# Patient Record
Sex: FEMALE | Race: WHITE | NOT HISPANIC OR LATINO | Employment: OTHER | URBAN - METROPOLITAN AREA
[De-identification: names, ages, dates, MRNs, and addresses within clinical notes are randomized per-mention and may not be internally consistent; named-entity substitution may affect disease eponyms.]

---

## 2017-08-18 ENCOUNTER — INFUSION (OUTPATIENT)
Dept: ONCOLOGY | Facility: HOSPITAL | Age: 67
End: 2017-08-18

## 2017-08-18 ENCOUNTER — HOSPITAL ENCOUNTER (EMERGENCY)
Facility: HOSPITAL | Age: 67
Discharge: HOME | End: 2017-08-18
Attending: EMERGENCY MEDICINE | Admitting: EMERGENCY MEDICINE

## 2017-08-18 ENCOUNTER — APPOINTMENT (OUTPATIENT)
Dept: GENERAL RADIOLOGY | Facility: HOSPITAL | Age: 67
End: 2017-08-18

## 2017-08-18 VITALS
OXYGEN SATURATION: 99 % | RESPIRATION RATE: 16 BRPM | HEART RATE: 63 BPM | TEMPERATURE: 98.7 F | WEIGHT: 179 LBS | HEIGHT: 63 IN | SYSTOLIC BLOOD PRESSURE: 180 MMHG | DIASTOLIC BLOOD PRESSURE: 53 MMHG | BODY MASS INDEX: 31.71 KG/M2

## 2017-08-18 VITALS
WEIGHT: 180 LBS | BODY MASS INDEX: 31.89 KG/M2 | HEART RATE: 71 BPM | TEMPERATURE: 97.8 F | DIASTOLIC BLOOD PRESSURE: 48 MMHG | SYSTOLIC BLOOD PRESSURE: 192 MMHG | HEIGHT: 63 IN | RESPIRATION RATE: 16 BRPM

## 2017-08-18 DIAGNOSIS — D64.9 ANEMIA, UNSPECIFIED TYPE: ICD-10-CM

## 2017-08-18 DIAGNOSIS — N18.9 CHRONIC RENAL FAILURE, UNSPECIFIED STAGE: ICD-10-CM

## 2017-08-18 DIAGNOSIS — R73.9 HYPERGLYCEMIA: ICD-10-CM

## 2017-08-18 DIAGNOSIS — R60.9 EDEMA, UNSPECIFIED TYPE: ICD-10-CM

## 2017-08-18 DIAGNOSIS — N18.30 CHRONIC KIDNEY DISEASE, STAGE III (MODERATE) (HCC): Primary | ICD-10-CM

## 2017-08-18 DIAGNOSIS — I51.7 CARDIOMEGALY: ICD-10-CM

## 2017-08-18 DIAGNOSIS — R00.2 PALPITATIONS: Primary | ICD-10-CM

## 2017-08-18 LAB
ALBUMIN SERPL-MCNC: 3.9 G/DL (ref 3.2–4.8)
ALBUMIN/GLOB SERPL: 1.4 G/DL (ref 1.5–2.5)
ALP SERPL-CCNC: 113 U/L (ref 25–100)
ALT SERPL W P-5'-P-CCNC: 18 U/L (ref 7–40)
ANION GAP SERPL CALCULATED.3IONS-SCNC: 10 MMOL/L (ref 3–11)
AST SERPL-CCNC: 18 U/L (ref 0–33)
BASOPHILS # BLD AUTO: 0.06 10*3/MM3 (ref 0–0.2)
BASOPHILS NFR BLD AUTO: 0.7 % (ref 0–1)
BILIRUB SERPL-MCNC: 0.3 MG/DL (ref 0.3–1.2)
BNP SERPL-MCNC: 254 PG/ML (ref 0–100)
BUN BLD-MCNC: 70 MG/DL (ref 9–23)
BUN/CREAT SERPL: 30.4 (ref 7–25)
CALCIUM SPEC-SCNC: 9.1 MG/DL (ref 8.7–10.4)
CHLORIDE SERPL-SCNC: 108 MMOL/L (ref 99–109)
CO2 SERPL-SCNC: 24 MMOL/L (ref 20–31)
CREAT BLD-MCNC: 2.3 MG/DL (ref 0.6–1.3)
DEPRECATED RDW RBC AUTO: 44.3 FL (ref 37–54)
EOSINOPHIL # BLD AUTO: 0.29 10*3/MM3 (ref 0–0.3)
EOSINOPHIL NFR BLD AUTO: 3.3 % (ref 0–3)
ERYTHROCYTE [DISTWIDTH] IN BLOOD BY AUTOMATED COUNT: 14.1 % (ref 11.3–14.5)
ERYTHROCYTE [DISTWIDTH] IN BLOOD BY AUTOMATED COUNT: 14.5 % (ref 11.3–14.5)
GFR SERPL CREATININE-BSD FRML MDRD: 21 ML/MIN/1.73
GLOBULIN UR ELPH-MCNC: 2.7 GM/DL
GLUCOSE BLD-MCNC: 142 MG/DL (ref 70–100)
HCT VFR BLD AUTO: 22.2 % (ref 34.5–44)
HCT VFR BLD AUTO: 24.2 % (ref 34.5–44)
HGB BLD-MCNC: 7.1 G/DL (ref 11.5–15.5)
HGB BLD-MCNC: 7.9 G/DL (ref 11.5–15.5)
HOLD SPECIMEN: NORMAL
HOLD SPECIMEN: NORMAL
IMM GRANULOCYTES # BLD: 0.02 10*3/MM3 (ref 0–0.03)
IMM GRANULOCYTES NFR BLD: 0.2 % (ref 0–0.6)
LYMPHOCYTES # BLD AUTO: 1.06 10*3/MM3 (ref 0.6–4.8)
LYMPHOCYTES # BLD AUTO: 1.2 10*3/MM3 (ref 0.6–4.8)
LYMPHOCYTES NFR BLD AUTO: 12.2 % (ref 24–44)
LYMPHOCYTES NFR BLD AUTO: 14.2 % (ref 24–44)
MAGNESIUM SERPL-MCNC: 2.6 MG/DL (ref 1.3–2.7)
MCH RBC QN AUTO: 26.5 PG (ref 27–31)
MCH RBC QN AUTO: 27.5 PG (ref 27–31)
MCHC RBC AUTO-ENTMCNC: 32.1 G/DL (ref 32–36)
MCHC RBC AUTO-ENTMCNC: 32.6 G/DL (ref 32–36)
MCV RBC AUTO: 82.4 FL (ref 80–99)
MCV RBC AUTO: 84.3 FL (ref 80–99)
MONOCYTES # BLD AUTO: 0.4 10*3/MM3 (ref 0–1)
MONOCYTES # BLD AUTO: 0.64 10*3/MM3 (ref 0–1)
MONOCYTES NFR BLD AUTO: 4.4 % (ref 0–12)
MONOCYTES NFR BLD AUTO: 7.3 % (ref 0–12)
NEUTROPHILS # BLD AUTO: 6.6 10*3/MM3 (ref 1.5–8.3)
NEUTROPHILS # BLD AUTO: 6.65 10*3/MM3 (ref 1.5–8.3)
NEUTROPHILS NFR BLD AUTO: 76.3 % (ref 41–71)
NEUTROPHILS NFR BLD AUTO: 81.4 % (ref 41–71)
PLATELET # BLD AUTO: 227 10*3/MM3 (ref 150–450)
PLATELET # BLD AUTO: 257 10*3/MM3 (ref 150–450)
PMV BLD AUTO: 10.2 FL (ref 6–12)
PMV BLD AUTO: 8.6 FL (ref 6–12)
POTASSIUM BLD-SCNC: 4.5 MMOL/L (ref 3.5–5.5)
PROT SERPL-MCNC: 6.6 G/DL (ref 5.7–8.2)
RBC # BLD AUTO: 2.69 10*6/MM3 (ref 3.89–5.14)
RBC # BLD AUTO: 2.87 10*6/MM3 (ref 3.89–5.14)
SODIUM BLD-SCNC: 142 MMOL/L (ref 132–146)
TROPONIN I SERPL-MCNC: 0 NG/ML (ref 0–0.07)
TROPONIN I SERPL-MCNC: 0 NG/ML (ref 0–0.07)
WBC NRBC COR # BLD: 8.1 10*3/MM3 (ref 3.5–10.8)
WBC NRBC COR # BLD: 8.72 10*3/MM3 (ref 3.5–10.8)
WHOLE BLOOD HOLD SPECIMEN: NORMAL
WHOLE BLOOD HOLD SPECIMEN: NORMAL

## 2017-08-18 PROCEDURE — 93010 ELECTROCARDIOGRAM REPORT: CPT | Performed by: INTERNAL MEDICINE

## 2017-08-18 PROCEDURE — 93005 ELECTROCARDIOGRAM TRACING: CPT | Performed by: EMERGENCY MEDICINE

## 2017-08-18 PROCEDURE — 99284 EMERGENCY DEPT VISIT MOD MDM: CPT

## 2017-08-18 PROCEDURE — 84484 ASSAY OF TROPONIN QUANT: CPT

## 2017-08-18 PROCEDURE — 83880 ASSAY OF NATRIURETIC PEPTIDE: CPT | Performed by: EMERGENCY MEDICINE

## 2017-08-18 PROCEDURE — 80053 COMPREHEN METABOLIC PANEL: CPT | Performed by: EMERGENCY MEDICINE

## 2017-08-18 PROCEDURE — 83735 ASSAY OF MAGNESIUM: CPT | Performed by: EMERGENCY MEDICINE

## 2017-08-18 PROCEDURE — 71010 HC CHEST PA OR AP: CPT

## 2017-08-18 PROCEDURE — 85025 COMPLETE CBC W/AUTO DIFF WBC: CPT | Performed by: EMERGENCY MEDICINE

## 2017-08-18 RX ORDER — SODIUM CHLORIDE 9 MG/ML
250 INJECTION, SOLUTION INTRAVENOUS ONCE
Status: DISCONTINUED | OUTPATIENT
Start: 2017-08-18 | End: 2017-08-18 | Stop reason: HOSPADM

## 2017-08-18 RX ORDER — ATORVASTATIN CALCIUM 20 MG/1
20 TABLET, FILM COATED ORAL NIGHTLY
COMMUNITY

## 2017-08-18 RX ORDER — ASPIRIN 81 MG/1
81 TABLET ORAL DAILY
COMMUNITY

## 2017-08-18 RX ORDER — TORSEMIDE 20 MG/1
20 TABLET ORAL 2 TIMES DAILY
COMMUNITY

## 2017-08-18 RX ORDER — LEVOTHYROXINE SODIUM 112 UG/1
112 TABLET ORAL DAILY
COMMUNITY

## 2017-08-18 RX ORDER — CARVEDILOL 25 MG/1
12.5 TABLET ORAL NIGHTLY
COMMUNITY

## 2017-08-18 RX ORDER — CLONIDINE HYDROCHLORIDE 0.1 MG/1
0.1 TABLET ORAL ONCE
Status: DISCONTINUED | OUTPATIENT
Start: 2017-08-18 | End: 2017-08-18 | Stop reason: HOSPADM

## 2017-08-18 RX ORDER — INSULIN GLARGINE 100 [IU]/ML
15 INJECTION, SOLUTION SUBCUTANEOUS DAILY
COMMUNITY

## 2017-08-18 RX ORDER — CLONIDINE HYDROCHLORIDE 0.1 MG/1
0.1 TABLET ORAL ONCE
Status: DISCONTINUED | OUTPATIENT
Start: 2017-08-18 | End: 2017-08-18 | Stop reason: SINTOL

## 2017-08-18 RX ORDER — CALCITRIOL 0.25 UG/1
0.25 CAPSULE, LIQUID FILLED ORAL 2 TIMES WEEKLY
Status: ON HOLD | COMMUNITY
End: 2022-03-23

## 2017-08-18 RX ORDER — SODIUM CHLORIDE 9 MG/ML
250 INJECTION, SOLUTION INTRAVENOUS ONCE
Status: CANCELLED | OUTPATIENT
Start: 2017-08-18

## 2017-08-18 RX ORDER — VALSARTAN 160 MG/1
160 TABLET ORAL 2 TIMES DAILY
COMMUNITY

## 2017-08-18 RX ORDER — RANITIDINE HCL 75 MG
75 TABLET ORAL 2 TIMES DAILY
Status: ON HOLD | COMMUNITY
End: 2022-03-23

## 2017-08-18 RX ORDER — FERROUS SULFATE 325(65) MG
325 TABLET ORAL
Status: ON HOLD | COMMUNITY
End: 2022-03-23

## 2017-08-18 RX ORDER — SODIUM CHLORIDE 0.9 % (FLUSH) 0.9 %
10 SYRINGE (ML) INJECTION AS NEEDED
Status: DISCONTINUED | OUTPATIENT
Start: 2017-08-18 | End: 2017-08-18 | Stop reason: HOSPADM

## 2017-08-18 RX ORDER — CLONIDINE HYDROCHLORIDE 0.1 MG/1
0.1 TABLET ORAL EVERY 12 HOURS SCHEDULED
Status: DISCONTINUED | OUTPATIENT
Start: 2017-08-18 | End: 2017-08-18 | Stop reason: SDUPTHER

## 2017-08-21 ENCOUNTER — HOSPITAL ENCOUNTER (OUTPATIENT)
Dept: CARDIOLOGY | Facility: HOSPITAL | Age: 67
End: 2017-08-21

## 2017-08-21 ENCOUNTER — APPOINTMENT (OUTPATIENT)
Dept: ONCOLOGY | Facility: HOSPITAL | Age: 67
End: 2017-08-21

## 2017-08-21 ENCOUNTER — OFFICE VISIT (OUTPATIENT)
Dept: CARDIOLOGY | Facility: HOSPITAL | Age: 67
End: 2017-08-21

## 2017-08-21 VITALS
WEIGHT: 178.6 LBS | DIASTOLIC BLOOD PRESSURE: 44 MMHG | BODY MASS INDEX: 31.64 KG/M2 | HEART RATE: 63 BPM | OXYGEN SATURATION: 100 % | RESPIRATION RATE: 16 BRPM | SYSTOLIC BLOOD PRESSURE: 141 MMHG | HEIGHT: 63 IN | TEMPERATURE: 97.8 F

## 2017-08-21 DIAGNOSIS — I50.32 CHRONIC CONGESTIVE HEART FAILURE WITH LEFT VENTRICULAR DIASTOLIC DYSFUNCTION (HCC): ICD-10-CM

## 2017-08-21 DIAGNOSIS — R07.9 CHEST PAIN IN ADULT: ICD-10-CM

## 2017-08-21 DIAGNOSIS — E10.59 TYPE 1 DIABETES MELLITUS WITH OTHER CIRCULATORY COMPLICATION (HCC): ICD-10-CM

## 2017-08-21 DIAGNOSIS — R00.2 PALPITATIONS: Primary | ICD-10-CM

## 2017-08-21 DIAGNOSIS — Q21.12 PFO (PATENT FORAMEN OVALE): ICD-10-CM

## 2017-08-21 DIAGNOSIS — N18.30 CHRONIC KIDNEY DISEASE, STAGE III (MODERATE) (HCC): ICD-10-CM

## 2017-08-21 PROCEDURE — 99214 OFFICE O/P EST MOD 30 MIN: CPT | Performed by: NURSE PRACTITIONER

## 2017-08-21 NOTE — PROGRESS NOTES
Subjective:     Encounter Date:08/21/2017      Patient ID: Loren Herrera is a 66 y.o. female.    Chief Complaint:  History of Present Illness:  Mrs. Herrera comes into the heart and valve clinic at the request of Dr. Roge Echavarria patient has a long history of type 1 diabetes, approximately 20 years duration.  [facility] for chronic kidney disease and related anemia.  Patient was scheduled for her routine iron infusion on 8/18/2017, however, she related worsening palpitations associated with chest tightness and shortness of breath for approximately one to 2 weeks duration.  Mrs. Herrera had seen her primary care physician, Dr. Munoz regarding his palpitations and a Holter monitor had been performed but the report has not yet been generated.  During the course of the emergency department evaluation, it was felt that she was suffering from some mild congestive heart failure which warranted cardiology follow-up.    Mrs. Herrera tells me that she had followed with Dr. Shepard at the [facility] up until about 18 months ago.  Over the past few years she has contemplated moving forward toward the kidney transplant list at , but presently she relates she is undecided about that decision due to her feeling of being overwhelmed I also of the diagnostic screening which must be performed. She brings in a clinic note from Dr. Shepard (2010) which appears to be clinic follow-up after echocardiogram and nuclear stress test with Lexiscan. No ischemic evidence noted.  Incidental PFO noted on echo.      During the emergency department evaluation, slightly elevated BNP and mild cardiomegaly/small right pleural effusion were noted.  Patient states she feels better today in regards to chest tightness after the diuretic was increased.  She states she has not been quite as symptomatic at the palpitations in the last 2 days also.    Past Medical History:   Diagnosis Date   • Anemia    •  Diabetes mellitus    • Disease of thyroid gland    • Hypertension        Past Surgical History:   Procedure Laterality Date   • ARTERIOVENOUS FISTULA     • BREAST SURGERY      lumpectomy   •  SECTION     • EYE SURGERY     • HYSTERECTOMY     • KNEE ARTHROPLASTY     • TONSILLECTOMY         Social History     Social History   • Marital status:      Spouse name: N/A   • Number of children: N/A   • Years of education: N/A     Occupational History   • Not on file.     Social History Main Topics   • Smoking status: Never Smoker   • Smokeless tobacco: Never Used   • Alcohol use No   • Drug use: No   • Sexual activity: Not on file     Other Topics Concern   • Not on file     Social History Narrative    Lives alone    Patient consumes 2 serving of caffeine daily. Stopped consuming caffeine last week.                Family History   Problem Relation Age of Onset   • Heart attack Father        Review of Systems   Constitution: Positive for weakness and malaise/fatigue. Negative for chills, decreased appetite, diaphoresis, fever, night sweats, weight gain and weight loss.   HENT: Negative for congestion, headaches, hearing loss, hoarse voice and nosebleeds.    Eyes: Negative for blurred vision, visual disturbance and visual halos.   Cardiovascular: Positive for dyspnea on exertion, irregular heartbeat, leg swelling and palpitations. Negative for chest pain, claudication, cyanosis, near-syncope, orthopnea, paroxysmal nocturnal dyspnea and syncope.   Respiratory: Positive for cough and shortness of breath. Negative for hemoptysis, sleep disturbances due to breathing, snoring, sputum production and wheezing.    Endocrine: Positive for cold intolerance.   Hematologic/Lymphatic: Negative for bleeding problem. Bruises/bleeds easily.   Skin: Negative for dry skin, itching and rash.   Musculoskeletal: Positive for joint pain and muscle cramps. Negative for arthritis, joint swelling and myalgias.   Gastrointestinal:  Positive for bloating, constipation, heartburn, nausea and vomiting. Negative for abdominal pain, diarrhea, flatus, hematemesis, hematochezia and melena.   Genitourinary: Negative for dysuria, frequency, hematuria, nocturia and urgency.   Neurological: Positive for excessive daytime sleepiness. Negative for dizziness, light-headedness and loss of balance.   Psychiatric/Behavioral: Negative for depression. The patient does not have insomnia and is not nervous/anxious.          Objective:     Physical Exam   Constitutional: She is oriented to person, place, and time. She appears well-developed and well-nourished. No distress.   HENT:   Head: Normocephalic.   Eyes: Pupils are equal, round, and reactive to light.   Neck: Neck supple. No JVD present. No tracheal deviation present.   Cardiovascular: Normal rate, regular rhythm and intact distal pulses.    No murmur heard.  Split S1, S2 heart tones.     Pulmonary/Chest: Effort normal and breath sounds normal. No respiratory distress. She has no wheezes. She has no rales. She exhibits no tenderness.   Abdominal: Soft. Bowel sounds are normal. There is no tenderness.   Musculoskeletal: Normal range of motion. She exhibits no edema.   Neurological: She is alert and oriented to person, place, and time.   Skin: Skin is warm and dry. No rash noted.   Psychiatric: She has a normal mood and affect. Her behavior is normal.       Lab Review: ER notes, labs, EKG's     Assessment/ Plan:           Diagnosis Plan   1. Palpitations with atypical chest pain  Her risk factors for coronary artery disease include long term type 1 diabetes requiring insulin use, obesity, age over 50, and poorly controlled hypertension.  Recommend myocardial perfusion study with Lexiscan.  We will obtain the 24-hour Holter report once available via primary care.  Will have patient follow-up with cardiology.       2. Chronic kidney disease, stage III (moderate) with chronic anemia  [facility]  Recent AV fistula placement to the left antecubital area approximate 7 weeks ago.  Patient is contemplating entering the kidney transplant clinic at .           4. Type 1 diabetes mellitus with other circulatory complication  Increase overall risk of cardiovascular events        5. PFO (patent foramen ovale)  Incidental finding per echocardiogram 2010.  No syncopal episodes or other neurological symptoms.       6. Chronic congestive heart failure with left ventricular diastolic dysfunction  Check follow-up echocardiogram to reassess diastolic dysfunction.  Symptomatically improvement with increased diuretic.  However, this requires close monitoring per nephrology.        Patient will be contacted with the results of her stress test and echocardiogram for appropriate timetable of cardiology clinic follow-up.

## 2017-09-15 ENCOUNTER — INFUSION (OUTPATIENT)
Dept: ONCOLOGY | Facility: HOSPITAL | Age: 67
End: 2017-09-15

## 2017-09-15 VITALS
TEMPERATURE: 97.2 F | HEART RATE: 61 BPM | WEIGHT: 173 LBS | BODY MASS INDEX: 30.65 KG/M2 | RESPIRATION RATE: 16 BRPM | HEIGHT: 63 IN | DIASTOLIC BLOOD PRESSURE: 47 MMHG | SYSTOLIC BLOOD PRESSURE: 168 MMHG

## 2017-09-15 DIAGNOSIS — R00.2 PALPITATIONS: ICD-10-CM

## 2017-09-15 DIAGNOSIS — N18.30 CHRONIC KIDNEY DISEASE, STAGE III (MODERATE) (HCC): Primary | ICD-10-CM

## 2017-09-15 DIAGNOSIS — I50.32 CHRONIC CONGESTIVE HEART FAILURE WITH LEFT VENTRICULAR DIASTOLIC DYSFUNCTION (HCC): ICD-10-CM

## 2017-09-15 PROCEDURE — 25010000002 FERUMOXYTOL 510 MG/17ML SOLUTION 510 MG VIAL: Performed by: INTERNAL MEDICINE

## 2017-09-15 PROCEDURE — 96374 THER/PROPH/DIAG INJ IV PUSH: CPT

## 2017-09-15 RX ORDER — SODIUM CHLORIDE 9 MG/ML
250 INJECTION, SOLUTION INTRAVENOUS ONCE
Status: CANCELLED | OUTPATIENT
Start: 2017-09-15

## 2017-09-15 RX ORDER — SODIUM CHLORIDE 9 MG/ML
250 INJECTION, SOLUTION INTRAVENOUS ONCE
Status: COMPLETED | OUTPATIENT
Start: 2017-09-15 | End: 2017-09-15

## 2017-09-15 RX ADMIN — SODIUM CHLORIDE 250 ML: 9 INJECTION, SOLUTION INTRAVENOUS at 14:24

## 2017-09-15 RX ADMIN — FERUMOXYTOL 510 MG: 510 INJECTION INTRAVENOUS at 14:24

## 2017-09-18 ENCOUNTER — INFUSION (OUTPATIENT)
Dept: ONCOLOGY | Facility: HOSPITAL | Age: 67
End: 2017-09-18

## 2017-09-18 VITALS
WEIGHT: 173 LBS | HEART RATE: 63 BPM | DIASTOLIC BLOOD PRESSURE: 43 MMHG | TEMPERATURE: 98.1 F | SYSTOLIC BLOOD PRESSURE: 155 MMHG | HEIGHT: 63 IN | BODY MASS INDEX: 30.65 KG/M2 | RESPIRATION RATE: 18 BRPM

## 2017-09-18 DIAGNOSIS — N18.30 CHRONIC KIDNEY DISEASE, STAGE III (MODERATE) (HCC): Primary | ICD-10-CM

## 2017-09-18 PROCEDURE — 96374 THER/PROPH/DIAG INJ IV PUSH: CPT

## 2017-09-18 PROCEDURE — 96365 THER/PROPH/DIAG IV INF INIT: CPT

## 2017-09-18 PROCEDURE — 25010000002 FERUMOXYTOL 510 MG/17ML SOLUTION 510 MG VIAL: Performed by: INTERNAL MEDICINE

## 2017-09-18 RX ORDER — SODIUM CHLORIDE 9 MG/ML
250 INJECTION, SOLUTION INTRAVENOUS ONCE
Status: COMPLETED | OUTPATIENT
Start: 2017-09-18 | End: 2017-09-18

## 2017-09-18 RX ORDER — SODIUM CHLORIDE 9 MG/ML
250 INJECTION, SOLUTION INTRAVENOUS ONCE
Status: CANCELLED | OUTPATIENT
Start: 2017-09-18

## 2017-09-18 RX ADMIN — FERUMOXYTOL 510 MG: 510 INJECTION INTRAVENOUS at 13:48

## 2017-09-18 RX ADMIN — SODIUM CHLORIDE 250 ML: 9 INJECTION, SOLUTION INTRAVENOUS at 13:48

## 2022-02-16 ENCOUNTER — PREP FOR SURGERY (OUTPATIENT)
Dept: OTHER | Facility: HOSPITAL | Age: 72
End: 2022-02-16

## 2022-02-16 DIAGNOSIS — Z12.11 SCREEN FOR COLON CANCER: Primary | ICD-10-CM

## 2022-02-16 DIAGNOSIS — Z80.0 FAMILY HISTORY OF COLON CANCER: ICD-10-CM

## 2022-03-21 ENCOUNTER — PRE-ADMISSION TESTING (OUTPATIENT)
Dept: PREADMISSION TESTING | Facility: HOSPITAL | Age: 72
End: 2022-03-21

## 2022-03-21 LAB
APTT PPP: 36.5 SECONDS (ref 22–39)
DEPRECATED RDW RBC AUTO: 50.6 FL (ref 37–54)
ERYTHROCYTE [DISTWIDTH] IN BLOOD BY AUTOMATED COUNT: 16.2 % (ref 12.3–15.4)
HCT VFR BLD AUTO: 31.2 % (ref 34–46.6)
HGB BLD-MCNC: 10.1 G/DL (ref 12–15.9)
MCH RBC QN AUTO: 28 PG (ref 26.6–33)
MCHC RBC AUTO-ENTMCNC: 32.4 G/DL (ref 31.5–35.7)
MCV RBC AUTO: 86.4 FL (ref 79–97)
PLATELET # BLD AUTO: 233 10*3/MM3 (ref 140–450)
PMV BLD AUTO: 10.3 FL (ref 6–12)
POTASSIUM SERPL-SCNC: 4.2 MMOL/L (ref 3.5–5.2)
QT INTERVAL: 478 MS
QTC INTERVAL: 444 MS
RBC # BLD AUTO: 3.61 10*6/MM3 (ref 3.77–5.28)
WBC NRBC COR # BLD: 7.14 10*3/MM3 (ref 3.4–10.8)

## 2022-03-21 PROCEDURE — 93010 ELECTROCARDIOGRAM REPORT: CPT | Performed by: INTERNAL MEDICINE

## 2022-03-21 PROCEDURE — 84132 ASSAY OF SERUM POTASSIUM: CPT

## 2022-03-21 PROCEDURE — 93005 ELECTROCARDIOGRAM TRACING: CPT

## 2022-03-21 PROCEDURE — 85027 COMPLETE CBC AUTOMATED: CPT

## 2022-03-21 PROCEDURE — 36415 COLL VENOUS BLD VENIPUNCTURE: CPT

## 2022-03-21 PROCEDURE — 85730 THROMBOPLASTIN TIME PARTIAL: CPT

## 2022-03-21 RX ORDER — MULTIVIT-MIN/IRON/FOLIC ACID/K 18-600-40
2000 CAPSULE ORAL DAILY
COMMUNITY

## 2022-03-21 RX ORDER — CLONIDINE 0.3 MG/24H
1 PATCH, EXTENDED RELEASE TRANSDERMAL WEEKLY
Status: ON HOLD | COMMUNITY
End: 2022-03-23

## 2022-03-21 RX ORDER — AMLODIPINE BESYLATE 2.5 MG/1
7.5 TABLET ORAL DAILY
COMMUNITY

## 2022-03-21 RX ORDER — DOXAZOSIN 2 MG/1
2 TABLET ORAL 2 TIMES DAILY
COMMUNITY

## 2022-03-21 RX ORDER — FAMOTIDINE 20 MG/1
20 TABLET, FILM COATED ORAL 2 TIMES DAILY
COMMUNITY

## 2022-03-21 NOTE — DISCHARGE INSTRUCTIONS
The following information and instructions were given:    Do not eat, drink, smoke or chew gum after midnight the night before surgery. This includes no mints.  Take all routine, prescribed medications including heart and blood pressure medicines with a sip of water unless otherwise instructed by your physician.   Do NOT take diabetic medication unless instructed by your physician.    DO NOT shave for two days before your procedure.  Do not wear makeup.      DO NOT wear fingernail polish (gel/regular) and/or acrylic/artificial nails on the day of surgery.   If you had a recent manicure and would rather not remove polish or artificial nails, the minimum requirement is that the polish/artificial nails must be removed from the middle finger on each hand.      Remove all jewelry (advise to go to jeweler if unable to remove).  Jewelry, especially rings, can no longer be taped for surgery.    Leave anything you consider valuable at home.    Bring the following with you the day of your procedure (when applicable):       -Picture ID and insurance cards       -Co-pay/deductible required by insurance       -Medications in the original bottles (not a list) including all over-the-counter medications if not brought to PAT       -Copy of advance directive, living will or power of  documents if not brought to PAT       -PAT Pass    Educational handout related to procedure given to patient.  Educational handout also includes general information related to their recovery that mentions signs and symptoms of infection and when to call the doctor.

## 2022-03-21 NOTE — PAT
Per Anesthesia Request, patient instructed not to take their ACE/ARB medications on the AM of surgery.    Instructed pt to call Dr. Warren and her PCP who manages her insulin for specific instructions the night before and morning of procedure.  She verbalized understanding.

## 2022-03-23 ENCOUNTER — HOSPITAL ENCOUNTER (OUTPATIENT)
Facility: HOSPITAL | Age: 72
Setting detail: HOSPITAL OUTPATIENT SURGERY
Discharge: HOME | End: 2022-03-23
Attending: INTERNAL MEDICINE | Admitting: INTERNAL MEDICINE

## 2022-03-23 ENCOUNTER — ANESTHESIA EVENT (OUTPATIENT)
Dept: GASTROENTEROLOGY | Facility: HOSPITAL | Age: 72
End: 2022-03-23

## 2022-03-23 ENCOUNTER — ANESTHESIA (OUTPATIENT)
Dept: GASTROENTEROLOGY | Facility: HOSPITAL | Age: 72
End: 2022-03-23

## 2022-03-23 VITALS
DIASTOLIC BLOOD PRESSURE: 74 MMHG | WEIGHT: 156.75 LBS | OXYGEN SATURATION: 98 % | HEIGHT: 63 IN | TEMPERATURE: 97.2 F | SYSTOLIC BLOOD PRESSURE: 170 MMHG | BODY MASS INDEX: 27.77 KG/M2 | RESPIRATION RATE: 16 BRPM | HEART RATE: 58 BPM

## 2022-03-23 LAB
GLUCOSE BLDC GLUCOMTR-MCNC: 111 MG/DL (ref 70–130)
GLUCOSE BLDC GLUCOMTR-MCNC: 141 MG/DL (ref 70–130)
POTASSIUM SERPL-SCNC: 3.9 MMOL/L (ref 3.5–5.2)

## 2022-03-23 PROCEDURE — 0 LIDOCAINE 1 % SOLUTION: Performed by: NURSE ANESTHETIST, CERTIFIED REGISTERED

## 2022-03-23 PROCEDURE — G0105 COLORECTAL SCRN; HI RISK IND: HCPCS | Performed by: INTERNAL MEDICINE

## 2022-03-23 PROCEDURE — 25010000002 PROPOFOL 10 MG/ML EMULSION: Performed by: NURSE ANESTHETIST, CERTIFIED REGISTERED

## 2022-03-23 PROCEDURE — 82962 GLUCOSE BLOOD TEST: CPT | Performed by: INTERNAL MEDICINE

## 2022-03-23 PROCEDURE — 84132 ASSAY OF SERUM POTASSIUM: CPT | Performed by: ANESTHESIOLOGY

## 2022-03-23 RX ORDER — SODIUM CHLORIDE, SODIUM LACTATE, POTASSIUM CHLORIDE, CALCIUM CHLORIDE 600; 310; 30; 20 MG/100ML; MG/100ML; MG/100ML; MG/100ML
INJECTION, SOLUTION INTRAVENOUS CONTINUOUS PRN
Status: DISCONTINUED | OUTPATIENT
Start: 2022-03-23 | End: 2022-03-23 | Stop reason: SURG

## 2022-03-23 RX ORDER — LIDOCAINE HYDROCHLORIDE 10 MG/ML
INJECTION, SOLUTION INFILTRATION; PERINEURAL AS NEEDED
Status: DISCONTINUED | OUTPATIENT
Start: 2022-03-23 | End: 2022-03-23 | Stop reason: SURG

## 2022-03-23 RX ORDER — FAMOTIDINE 20 MG/1
20 TABLET, FILM COATED ORAL ONCE
Status: CANCELLED | OUTPATIENT
Start: 2022-03-23 | End: 2022-03-23

## 2022-03-23 RX ORDER — FAMOTIDINE 10 MG/ML
20 INJECTION, SOLUTION INTRAVENOUS ONCE
Status: COMPLETED | OUTPATIENT
Start: 2022-03-23 | End: 2022-03-23

## 2022-03-23 RX ORDER — MIDAZOLAM HYDROCHLORIDE 1 MG/ML
0.5 INJECTION INTRAMUSCULAR; INTRAVENOUS
Status: DISCONTINUED | OUTPATIENT
Start: 2022-03-23 | End: 2022-03-23 | Stop reason: HOSPADM

## 2022-03-23 RX ORDER — SODIUM CHLORIDE, SODIUM LACTATE, POTASSIUM CHLORIDE, CALCIUM CHLORIDE 600; 310; 30; 20 MG/100ML; MG/100ML; MG/100ML; MG/100ML
9 INJECTION, SOLUTION INTRAVENOUS CONTINUOUS
Status: CANCELLED | OUTPATIENT
Start: 2022-03-23

## 2022-03-23 RX ORDER — SODIUM CHLORIDE 9 MG/ML
9 INJECTION, SOLUTION INTRAVENOUS CONTINUOUS
Status: DISCONTINUED | OUTPATIENT
Start: 2022-03-23 | End: 2022-03-23 | Stop reason: HOSPADM

## 2022-03-23 RX ORDER — LIDOCAINE HYDROCHLORIDE 10 MG/ML
0.5 INJECTION, SOLUTION EPIDURAL; INFILTRATION; INTRACAUDAL; PERINEURAL ONCE AS NEEDED
Status: CANCELLED | OUTPATIENT
Start: 2022-03-23

## 2022-03-23 RX ORDER — PROPOFOL 10 MG/ML
VIAL (ML) INTRAVENOUS AS NEEDED
Status: DISCONTINUED | OUTPATIENT
Start: 2022-03-23 | End: 2022-03-23 | Stop reason: SURG

## 2022-03-23 RX ORDER — SODIUM CHLORIDE 0.9 % (FLUSH) 0.9 %
10 SYRINGE (ML) INJECTION AS NEEDED
Status: DISCONTINUED | OUTPATIENT
Start: 2022-03-23 | End: 2022-03-23 | Stop reason: HOSPADM

## 2022-03-23 RX ORDER — SODIUM CHLORIDE 0.9 % (FLUSH) 0.9 %
10 SYRINGE (ML) INJECTION EVERY 12 HOURS SCHEDULED
Status: CANCELLED | OUTPATIENT
Start: 2022-03-23

## 2022-03-23 RX ADMIN — SODIUM CHLORIDE 9 ML/HR: 9 INJECTION, SOLUTION INTRAVENOUS at 13:31

## 2022-03-23 RX ADMIN — PROPOFOL 60 MG: 10 INJECTION, EMULSION INTRAVENOUS at 14:39

## 2022-03-23 RX ADMIN — SODIUM CHLORIDE, POTASSIUM CHLORIDE, SODIUM LACTATE AND CALCIUM CHLORIDE: 600; 310; 30; 20 INJECTION, SOLUTION INTRAVENOUS at 14:34

## 2022-03-23 RX ADMIN — LIDOCAINE HYDROCHLORIDE 40 MG: 10 INJECTION, SOLUTION INFILTRATION; PERINEURAL at 14:41

## 2022-03-23 RX ADMIN — Medication 10 ML: at 13:32

## 2022-03-23 RX ADMIN — PROPOFOL 60 MG: 10 INJECTION, EMULSION INTRAVENOUS at 14:42

## 2022-03-23 RX ADMIN — PROPOFOL 50 MG: 10 INJECTION, EMULSION INTRAVENOUS at 14:53

## 2022-03-23 RX ADMIN — PROPOFOL 50 MG: 10 INJECTION, EMULSION INTRAVENOUS at 15:00

## 2022-03-23 RX ADMIN — PROPOFOL 50 MG: 10 INJECTION, EMULSION INTRAVENOUS at 14:48

## 2022-03-23 RX ADMIN — FAMOTIDINE 20 MG: 10 INJECTION INTRAVENOUS at 13:32

## 2022-03-23 NOTE — OP NOTE
Colonoscopy    Instrument: Pediatric colonoscope      Medications: As per anesthesia    Preop diagnosis: Family history of colon cancer    Postop diagnosis: Diverticulosis    Indications: The patient is a 71-year-old with a family history of colon cancer in her father.  Her last colonoscopy was 10/7/2016 and was remarkable for diverticulosis.  She has no blood in the stool, change in bowel pattern, or abdominal pain.       Procedure: After the patient was informed of the risks, benefits, alternatives to the procedure, informed consent was obtained.  The endoscope was inserted into the rectum and advanced to the cecum.  The cecum was identified by appendiceal orifice and ileocecal valve.  There was some liquid mixed with small bits of solid throughout the colon.  There was no polyps or masses seen in the cecum right colon transverse colon descending colon sigmoid colon and rectum.  The sigmoid had moderate to severe diverticulosis.  Again no polyps were seen.  The patient tolerated the procedure well and there were no immediate complications.  There was a 7-minute withdrawal time.      Impressions and plan #1 diverticulosis which was especially prevalent in the sigmoid colon.    Plan I would follow-up as needed.    CC referring Jaydon Dillon MD

## 2022-03-23 NOTE — ANESTHESIA PREPROCEDURE EVALUATION
Anesthesia Evaluation     Patient summary reviewed and Nursing notes reviewed   NPO Solid Status: > 8 hours  NPO Liquid Status: > 8 hours           Airway   Mallampati: I  TM distance: >3 FB  Neck ROM: full  No difficulty expected  Dental - normal exam     Pulmonary     breath sounds clear to auscultation  Cardiovascular   Exercise tolerance: good (4-7 METS)    Rhythm: regular  Rate: normal        Neuro/Psych  GI/Hepatic/Renal/Endo    (+)   renal disease ESRD and dialysis, diabetes mellitus type 1 well controlled,     Musculoskeletal     Abdominal    Substance History      OB/GYN          Other                      Anesthesia Plan    ASA 3     MAC     intravenous induction     Anesthetic plan, all risks, benefits, and alternatives have been provided, discussed and informed consent has been obtained with: patient.        CODE STATUS:

## 2022-03-23 NOTE — ANESTHESIA POSTPROCEDURE EVALUATION
Patient: Loren Herrera    Procedure Summary     Date: 03/23/22 Room / Location:  [facility] /  [facility]    Anesthesia Start: 1434 Anesthesia Stop: 1506    Procedure: Colonoscopy with possible polypectomy, biopsy and control of GI bleeding (N/A ) Diagnosis:       Family history of colon cancer      Screen for colon cancer      (Family history of colon cancer [Z80.0])      (Screen for colon cancer [Z12.11])    Surgeons: Daryl Warren MD Provider: Dionisio Aleajndro MD    Anesthesia Type: MAC ASA Status: 3          Anesthesia Type: MAC    Vitals  Vitals Value Taken Time   /60 03/23/22 1506   Temp     Pulse 56 03/23/22 1510   Resp     SpO2 93 % 03/23/22 1510   Vitals shown include unvalidated device data.        Post Anesthesia Care and Evaluation    Patient location during evaluation: PACU  Patient participation: complete - patient participated  Level of consciousness: responsive to physical stimuli  Pain management: adequate  Airway patency: patent  Anesthetic complications: No anesthetic complications  PONV Status: none  Cardiovascular status: hemodynamically stable and acceptable  Respiratory status: nonlabored ventilation, acceptable and nasal cannula  Hydration status: acceptable

## 2022-03-23 NOTE — H&P
Gastroenterology Consult Note    Reason for Consultation: Family history of colon cancer/screening    Patient Care Team:  Jaydon Munoz MD as PCP - General  Jaydon Munoz MD as PCP - Family Medicine    Chief complaint: Family history colon cancer/screening      History of present illness: The patient is a 71-year-old who is waiting for kidney transplant.  She also has a family history of colon cancer in her father.  Her last colonoscopy was 10/7/2016.  She is referred for reevaluation.  She denies any blood in the stool, change in bowel pattern, or abdominal pain.  She has been on dialysis the past 3 years.  She is a non-smoker and has not been a heavy drinker.    Allergies   Allergen Reactions   • Insulin Detemir Swelling     Pt states it is Levemir   • Penicillins Hives     Prior to Admission medications    Medication Sig Start Date End Date Taking? Authorizing Provider   amLODIPine (NORVASC) 2.5 MG tablet Take 7.5 mg by mouth Daily.   Yes Cristin Fulton MD   aspirin 81 MG EC tablet Take 81 mg by mouth Daily.   Yes Cristin Fulton MD   atorvastatin (LIPITOR) 20 MG tablet Take 20 mg by mouth Every Night. 40mg one day, 20 the next (alternating)   Yes Cristin Fulton MD   carvedilol (COREG) 25 MG tablet Take 12.5 mg by mouth Every Night.   Yes Cristin Fulton MD   doxazosin (CARDURA) 2 MG tablet Take 2 mg by mouth 2 (Two) Times a Day.   Yes Cristin Fulton MD   famotidine (PEPCID) 20 MG tablet Take 20 mg by mouth 2 (Two) Times a Day.   Yes Cristin Fulton MD   insulin glargine (LANTUS) 100 UNIT/ML injection Inject 15 Units under the skin into the appropriate area as directed Daily. IN AM.   Yes Cristin Fulton MD   insulin lispro (humaLOG) 100 UNIT/ML injection Inject  under the skin into the appropriate area as directed 3 (Three) Times a Day. 4-5 UNITS BEFORE MEALS   Yes Cristin Fulton MD   levothyroxine (SYNTHROID, LEVOTHROID) 112 MCG  "tablet Take 112 mcg by mouth Daily.   Yes Cristin Fulton MD   PEG-KCl-NaCl-NaSulf-Na Asc-C (PLENVU) 140 g reconstituted solution solution Take 212.05 g by mouth Daily. Do not eat the day before your procedure. Call [phone] if you have questions. 2/16/22  Yes Tess Dixon APRN   torsemide (DEMADEX) 20 MG tablet Take 20 mg by mouth 2 (Two) Times a Day.   Yes Cristin Fulton MD   valsartan (DIOVAN) 160 MG tablet Take 160 mg by mouth 2 (Two) Times a Day.   Yes Cristin Fulton MD   Cholecalciferol (Vitamin D) 50 MCG (2000 UT) capsule Take 2,000 Units by mouth Daily.    Cristin Fulton MD   CloNIDine (CATAPRES-TTS) 0.1 MG/24HR patch Place 1 patch on the skin 1 (One) Time Per Week.    Cristin Fulton MD   calcitriol (ROCALTROL) 0.25 MCG capsule Take 0.25 mcg by mouth 2 (Two) Times a Week.  3/23/22  Cristin Fulton MD   cloNIDine (CATAPRES-TTS) 0.3 MG/24HR patch Place 1 patch on the skin as directed by provider 1 (One) Time Per Week. CHANGES PATCH EVERY PAULINE  3/23/22  Cristin Fulton MD   ferrous sulfate 325 (65 FE) MG tablet Take 325 mg by mouth 3 (Three) Times a Day With Meals.  3/23/22  Cristin Fulton MD   raNITIdine (ZANTAC) 75 MG tablet Take 75 mg by mouth 2 (Two) Times a Day.  3/23/22  Cristin Fulton MD      Current Facility-Administered Medications   Medication Dose Route Frequency Provider Last Rate Last Admin   • midazolam (VERSED) injection 0.5 mg  0.5 mg Intravenous Q10 Min PRN Dionisio Alejandro MD       • sodium chloride 0.9 % flush 10 mL  10 mL Intravenous PRN Dionisio Alejandro MD   10 mL at 03/23/22 1332   • sodium chloride 0.9 % infusion  9 mL/hr Intravenous Continuous Dionisio Alejandro MD 9 mL/hr at 03/23/22 1331 9 mL/hr at 03/23/22 1331      Past Medical History:   Diagnosis Date   • Anemia    • Cancer (HCC)     \"stage 1 uterine cancer prior to hyst\"  per pt   • Diabetes mellitus (HCC)    • Disease of thyroid gland    • End stage renal disease " (HCC)     per pt   • History of transfusion      or 2021 (1 unit) [place] (no reactions per pt)   • Hypertension    • PONV (postoperative nausea and vomiting)    • Slow to wake up after anesthesia      Past Surgical History:   Procedure Laterality Date   • ARTERIOVENOUS FISTULA      left arm   • BREAST SURGERY      lumpectomy   •  SECTION     • EYE SURGERY     • HYSTERECTOMY     • KNEE ARTHROPLASTY     • TONSILLECTOMY       Family History   Problem Relation Age of Onset   • Heart attack Father      GI Family History  Colon Cancer and No family history of colon polyps or cancers  Social History     Tobacco Use   Smoking Status Never Smoker   Smokeless Tobacco Never Used     Social History     Substance and Sexual Activity   Alcohol Use No      Social History     Substance and Sexual Activity   Drug Use No        ------  Review of Systems    Vital Signs   Temp:  [97.7 °F (36.5 °C)] 97.7 °F (36.5 °C)  Heart Rate:  [67] 67  Resp:  [16] 16  BP: (181)/(71) 181/71    Physical Exam:   General Appearance: Alert, in no acute distress  Head: Normocephalic, without obvious abnormality, atraumatic  Eyes: Lids and lashes normal, conjunctivae and sclerae normal, no icterus, no pallor, corneas clear, PERRLA  Ears: Ears appear intact with no abnormalities noted  Neck: No adenopathy, supple, trachea midline, no thyromegaly, no JVD  Lungs: respirations regular, even and unlabored Heart: Regular rhythm and normal rate  Chest Wall: Symmetrical respiratory expansion  Abdomen: no masses, no organomegaly, soft nontender, nondistended, no guarding  Extremities:  Moves all extremities well, no edema, no cyanosis, no redness  Skin: No bleeding, bruising or rash  Neurologic: Cranial nerves 2 - 12 grossly intact, no focal deficits       LABS:   Lab Results (last 48 hours)     Procedure Component Value Units Date/Time    Potassium [[id number]]  (Normal) Collected: 22 1320    Specimen: Blood Updated:  03/23/22 1347     Potassium 3.9 mmol/L         RADIOLOGY:  Imaging Results (Last 24 Hours)     ** No results found for the last 24 hours. **          Assessment/Plan       Family history of colon cancer    Screen for colon cancer      Impressions and plan #1 family history of colon cancer: Her father was at advanced age so she is probably not at greatly increased risk.  We will plan a colonoscopy and remove any polyps that may be present.  Risks, benefits, alternatives were explained in detail.    I discussed the patient's findings and my recommendations with patient    Daryl Warren MD  03/23/22  14:21 EDT

## 2024-01-11 ENCOUNTER — TELEPHONE (OUTPATIENT)
Dept: GASTROENTEROLOGY | Facility: CLINIC | Age: 74
End: 2024-01-11
Payer: MEDICARE

## 2024-01-11 NOTE — TELEPHONE ENCOUNTER
Patient called and said that someone was checking on whether or not she was  needing a colonoscopy.  She wanted to reports some new findings.    Her Hgb is now 8.7 and she did 3 stool tests that all came back negative.

## 2024-02-15 RX ORDER — SODIUM, POTASSIUM,MAG SULFATES 17.5-3.13G
SOLUTION, RECONSTITUTED, ORAL ORAL
Qty: 354 ML | Refills: 0 | Status: SHIPPED | OUTPATIENT
Start: 2024-02-15

## 2024-02-21 ENCOUNTER — PRE-ADMISSION TESTING (OUTPATIENT)
Dept: PREADMISSION TESTING | Facility: HOSPITAL | Age: 74
End: 2024-02-21
Payer: MEDICARE

## 2024-02-21 VITALS — BODY MASS INDEX: 25.61 KG/M2 | HEIGHT: 64 IN | WEIGHT: 150.02 LBS

## 2024-02-21 LAB
DEPRECATED RDW RBC AUTO: 51.8 FL (ref 37–54)
ERYTHROCYTE [DISTWIDTH] IN BLOOD BY AUTOMATED COUNT: 15.6 % (ref 12.3–15.4)
HCT VFR BLD AUTO: 26.6 % (ref 34–46.6)
HGB BLD-MCNC: 8.5 G/DL (ref 12–15.9)
MCH RBC QN AUTO: 29.2 PG (ref 26.6–33)
MCHC RBC AUTO-ENTMCNC: 32 G/DL (ref 31.5–35.7)
MCV RBC AUTO: 91.4 FL (ref 79–97)
PLATELET # BLD AUTO: 198 10*3/MM3 (ref 140–450)
PMV BLD AUTO: 10.6 FL (ref 6–12)
POTASSIUM SERPL-SCNC: 3.6 MMOL/L (ref 3.5–5.2)
RBC # BLD AUTO: 2.91 10*6/MM3 (ref 3.77–5.28)
WBC NRBC COR # BLD AUTO: 6.16 10*3/MM3 (ref 3.4–10.8)

## 2024-02-21 PROCEDURE — 84132 ASSAY OF SERUM POTASSIUM: CPT

## 2024-02-21 PROCEDURE — 85027 COMPLETE CBC AUTOMATED: CPT

## 2024-02-21 PROCEDURE — 93005 ELECTROCARDIOGRAM TRACING: CPT

## 2024-02-21 PROCEDURE — 36415 COLL VENOUS BLD VENIPUNCTURE: CPT

## 2024-02-21 RX ORDER — PANTOPRAZOLE SODIUM 40 MG/1
40 TABLET, DELAYED RELEASE ORAL DAILY
COMMUNITY

## 2024-02-21 RX ORDER — METHOXY POLYETHYLENE GLYCOL-EPOETIN BETA 50 UG/.3ML
INJECTION, SOLUTION INTRAVENOUS
COMMUNITY

## 2024-02-21 RX ORDER — NIFEDIPINE 60 MG/1
60 TABLET, EXTENDED RELEASE ORAL DAILY
COMMUNITY

## 2024-02-21 RX ORDER — HYDRALAZINE HYDROCHLORIDE 25 MG/1
25 TABLET, FILM COATED ORAL 3 TIMES DAILY
COMMUNITY

## 2024-02-21 RX ORDER — SPIRONOLACTONE 25 MG/1
12.5-25 TABLET ORAL DAILY
COMMUNITY

## 2024-02-21 RX ORDER — CALCIUM ACETATE 667 MG/1
667 CAPSULE ORAL 3 TIMES DAILY
COMMUNITY

## 2024-02-21 NOTE — PAT
An arrival time for procedure was not provided during PAT visit. If patient had any questions or concerns about their arrival time, they were instructed to contact their surgeon/physician.  Additionally, if the patient referred to an arrival time that was acquired from their my chart account, patient was encouraged to verify that time with their surgeon/physician. Arrival times are NOT provided in Pre Admission Testing Department.    Patient denies any current skin issues.     Per Anesthesia Request, patient instructed not to take their ACE/ARB medications on the AM of surgery.    No blood pressures or sticks to LEFT arm due to AV fistula. Patient wearing a pink bracelet and advised to bring on the day of procedure. Note in FYI tab.     EKG from PAT today faxed to anesthesiology department for review and cardiac clearance. RN spoke with   and reviewed pertinent medical history and EKG results.  Per Dr. La, patient is cleared to proceed with procedure as planned without additional cardiac testing. Patient denies chest pain or increased shortness of breath.

## 2024-02-22 ENCOUNTER — TELEPHONE (OUTPATIENT)
Dept: GASTROENTEROLOGY | Facility: CLINIC | Age: 74
End: 2024-02-22
Payer: MEDICARE

## 2024-02-22 RX ORDER — SOD SULF/POT CHLORIDE/MAG SULF 1.479 G
1 TABLET ORAL ONCE
Qty: 24 TABLET | Refills: 0 | Status: SHIPPED | OUTPATIENT
Start: 2024-02-22 | End: 2024-02-22

## 2024-02-22 NOTE — TELEPHONE ENCOUNTER
Hub staff attempted to follow warm transfer process and was unsuccessful     Caller: Loren Herrera    Relationship to patient: Self    Best call back number: [phone]    Patient is needing: PT CALLED STATING THAT SHE WOULD LIKE THE PILL FORM OF THE COLONOSCOPY PREP// PT STATES THAT SHE WOULD LIKE THE PILLS BECAUSE SHE IS A DIALYSIS PT AND CAN'T HAVE MUCH FLUID INTAKE// PT ALSO STATED THAT THE MEDICATION SHOULD BE SENT TO [facility]// PLEASE CALL PT UPON SENDING

## 2024-02-23 LAB
QT INTERVAL: 458 MS
QTC INTERVAL: 453 MS

## 2024-02-28 ENCOUNTER — ANESTHESIA (OUTPATIENT)
Dept: GASTROENTEROLOGY | Facility: HOSPITAL | Age: 74
End: 2024-02-28
Payer: MEDICARE

## 2024-02-28 ENCOUNTER — HOSPITAL ENCOUNTER (OUTPATIENT)
Facility: HOSPITAL | Age: 74
Setting detail: HOSPITAL OUTPATIENT SURGERY
Discharge: HOME | End: 2024-02-28
Attending: INTERNAL MEDICINE | Admitting: INTERNAL MEDICINE
Payer: MEDICARE

## 2024-02-28 ENCOUNTER — ANESTHESIA EVENT (OUTPATIENT)
Dept: GASTROENTEROLOGY | Facility: HOSPITAL | Age: 74
End: 2024-02-28
Payer: MEDICARE

## 2024-02-28 VITALS
OXYGEN SATURATION: 97 % | HEART RATE: 61 BPM | RESPIRATION RATE: 16 BRPM | DIASTOLIC BLOOD PRESSURE: 47 MMHG | SYSTOLIC BLOOD PRESSURE: 130 MMHG | TEMPERATURE: 97.8 F

## 2024-02-28 DIAGNOSIS — Z12.11 SCREEN FOR COLON CANCER: ICD-10-CM

## 2024-02-28 LAB
DEPRECATED RDW RBC AUTO: 52.7 FL (ref 37–54)
ERYTHROCYTE [DISTWIDTH] IN BLOOD BY AUTOMATED COUNT: 16 % (ref 12.3–15.4)
GLUCOSE BLDC GLUCOMTR-MCNC: 274 MG/DL (ref 70–130)
HCT VFR BLD AUTO: 26.8 % (ref 34–46.6)
HGB BLD-MCNC: 8.7 G/DL (ref 12–15.9)
MCH RBC QN AUTO: 29.3 PG (ref 26.6–33)
MCHC RBC AUTO-ENTMCNC: 32.5 G/DL (ref 31.5–35.7)
MCV RBC AUTO: 90.2 FL (ref 79–97)
PLATELET # BLD AUTO: 195 10*3/MM3 (ref 140–450)
PMV BLD AUTO: 10.6 FL (ref 6–12)
POTASSIUM SERPL-SCNC: 4.5 MMOL/L (ref 3.5–5.2)
RBC # BLD AUTO: 2.97 10*6/MM3 (ref 3.77–5.28)
WBC NRBC COR # BLD AUTO: 6.52 10*3/MM3 (ref 3.4–10.8)

## 2024-02-28 PROCEDURE — 84132 ASSAY OF SERUM POTASSIUM: CPT | Performed by: ANESTHESIOLOGY

## 2024-02-28 PROCEDURE — 25810000003 SODIUM CHLORIDE 0.9 % SOLUTION: Performed by: NURSE ANESTHETIST, CERTIFIED REGISTERED

## 2024-02-28 PROCEDURE — 45378 DIAGNOSTIC COLONOSCOPY: CPT | Performed by: INTERNAL MEDICINE

## 2024-02-28 PROCEDURE — 25010000002 PROPOFOL 10 MG/ML EMULSION: Performed by: NURSE ANESTHETIST, CERTIFIED REGISTERED

## 2024-02-28 PROCEDURE — 82948 REAGENT STRIP/BLOOD GLUCOSE: CPT | Performed by: INTERNAL MEDICINE

## 2024-02-28 PROCEDURE — 85027 COMPLETE CBC AUTOMATED: CPT | Performed by: ANESTHESIOLOGY

## 2024-02-28 RX ORDER — SODIUM CHLORIDE 9 MG/ML
INJECTION, SOLUTION INTRAVENOUS CONTINUOUS PRN
Status: DISCONTINUED | OUTPATIENT
Start: 2024-02-28 | End: 2024-02-28 | Stop reason: SURG

## 2024-02-28 RX ORDER — SODIUM CHLORIDE 0.9 % (FLUSH) 0.9 %
3 SYRINGE (ML) INJECTION EVERY 12 HOURS SCHEDULED
Status: DISCONTINUED | OUTPATIENT
Start: 2024-02-28 | End: 2024-02-28 | Stop reason: HOSPADM

## 2024-02-28 RX ORDER — IPRATROPIUM BROMIDE AND ALBUTEROL SULFATE 2.5; .5 MG/3ML; MG/3ML
3 SOLUTION RESPIRATORY (INHALATION) ONCE AS NEEDED
Status: DISCONTINUED | OUTPATIENT
Start: 2024-02-28 | End: 2024-02-28 | Stop reason: HOSPADM

## 2024-02-28 RX ORDER — SODIUM CHLORIDE 9 MG/ML
40 INJECTION, SOLUTION INTRAVENOUS AS NEEDED
Status: DISCONTINUED | OUTPATIENT
Start: 2024-02-28 | End: 2024-02-28 | Stop reason: HOSPADM

## 2024-02-28 RX ORDER — SODIUM CHLORIDE 0.9 % (FLUSH) 0.9 %
10 SYRINGE (ML) INJECTION AS NEEDED
Status: DISCONTINUED | OUTPATIENT
Start: 2024-02-28 | End: 2024-02-28 | Stop reason: HOSPADM

## 2024-02-28 RX ORDER — PROPOFOL 10 MG/ML
VIAL (ML) INTRAVENOUS AS NEEDED
Status: DISCONTINUED | OUTPATIENT
Start: 2024-02-28 | End: 2024-02-28 | Stop reason: SURG

## 2024-02-28 RX ORDER — LIDOCAINE HYDROCHLORIDE 10 MG/ML
INJECTION, SOLUTION EPIDURAL; INFILTRATION; INTRACAUDAL; PERINEURAL AS NEEDED
Status: DISCONTINUED | OUTPATIENT
Start: 2024-02-28 | End: 2024-02-28 | Stop reason: SURG

## 2024-02-28 RX ORDER — ONDANSETRON 2 MG/ML
4 INJECTION INTRAMUSCULAR; INTRAVENOUS ONCE AS NEEDED
Status: DISCONTINUED | OUTPATIENT
Start: 2024-02-28 | End: 2024-02-28 | Stop reason: HOSPADM

## 2024-02-28 RX ORDER — DEXTROSE, SODIUM CHLORIDE, SODIUM LACTATE, POTASSIUM CHLORIDE, AND CALCIUM CHLORIDE 5; .6; .31; .03; .02 G/100ML; G/100ML; G/100ML; G/100ML; G/100ML
100 INJECTION, SOLUTION INTRAVENOUS CONTINUOUS
Status: DISCONTINUED | OUTPATIENT
Start: 2024-02-28 | End: 2024-02-28 | Stop reason: HOSPADM

## 2024-02-28 RX ADMIN — PROPOFOL 100 MCG/KG/MIN: 10 INJECTION, EMULSION INTRAVENOUS at 15:38

## 2024-02-28 RX ADMIN — PROPOFOL 20 MG: 10 INJECTION, EMULSION INTRAVENOUS at 15:54

## 2024-02-28 RX ADMIN — PROPOFOL 30 MG: 10 INJECTION, EMULSION INTRAVENOUS at 15:52

## 2024-02-28 RX ADMIN — SODIUM CHLORIDE: 9 INJECTION, SOLUTION INTRAVENOUS at 15:26

## 2024-02-28 RX ADMIN — PROPOFOL 50 MG: 10 INJECTION, EMULSION INTRAVENOUS at 15:50

## 2024-02-28 RX ADMIN — LIDOCAINE HYDROCHLORIDE 50 MG: 10 INJECTION, SOLUTION EPIDURAL; INFILTRATION; INTRACAUDAL; PERINEURAL at 15:50

## 2024-02-28 NOTE — ANESTHESIA PREPROCEDURE EVALUATION
Anesthesia Evaluation     Patient summary reviewed and Nursing notes reviewed   history of anesthetic complications:  PONV  NPO Solid Status: Waived due to emergency  NPO Liquid Status: > 2 hours           Airway   Mallampati: I  TM distance: >3 FB  Neck ROM: full  No difficulty expected  Dental      Pulmonary    (+) pleural effusion (chronic bilateral),sleep apnea  (-) shortness of breath, recent URI, not a smoker, no home oxygen  Cardiovascular     ECG reviewed    (+) hypertension, CHF , hyperlipidemia  (-) past MI, dysrhythmias, angina, cardiac stents    ROS comment: ECG SB    with blocked PAC LAD LVH pulm disease pattern   ECHO Good Ef valves Ok       Stress neg for ischemia   Cath Neg for CAD     Neuro/Psych  (+) CVA  (-) seizures  GI/Hepatic/Renal/Endo    (+) GERD, renal disease- ESRD, CRI and dialysis, diabetes mellitus type 1, thyroid problem     Musculoskeletal     Abdominal    Substance History      OB/GYN          Other   arthritis, blood dyscrasia anemia,   history of cancer    ROS/Med Hx Other: Chronic anemia renal                     Anesthesia Plan    ASA 3     general and MAC     (PFL  K pending )  intravenous induction     Anesthetic plan, risks, benefits, and alternatives have been provided, discussed and informed consent has been obtained with: patient.    Plan discussed with CRNA.        CODE STATUS:

## 2024-02-28 NOTE — H&P
Gastroenterology Consult Note    Reason for Consultation: Anemia    Patient Care Team:  Piter Reaves MD as PCP - General (Internal Medicine)  Tej Shepard MD as Consulting Physician (Cardiology)  Kobi Salter MD as Consulting Physician (Nephrology)    Chief complaint: Anemia      History of present illness: The patient is a 73-year-old with chronic renal failure on dialysis who had some bleeding after the excision of a basal cell carcinoma.  She also was found to have some heme positive stool.  She became anemic.  She had an upper endoscopy at  and no etiology was found.  A colonoscopy was suggested.  It looks like her last colonoscopy was done in March 23, 2022.  At that time she had diverticulosis.  No polyps were seen.     Allergies   Allergen Reactions    Insulin Detemir Swelling     Pt states it is Levemir  Local reaction     Penicillins Hives     Prior to Admission medications    Medication Sig Start Date End Date Taking? Authorizing Provider   aspirin 81 MG EC tablet Take 1 tablet by mouth Daily.   Yes Cristin Fulton MD   atorvastatin (LIPITOR) 20 MG tablet Take 1 tablet by mouth Every Night. 40mg one day, 20 the next (alternating)   Yes Cristin Fulton MD   calcium acetate (PHOS BINDER,) 667 MG capsule capsule Take 1 capsule by mouth 3 (Three) Times a Day.   Yes Cristin Fulton MD   carvedilol (COREG) 25 MG tablet Take 0.5 tablets by mouth 2 (Two) Times a Day With Meals.   Yes Cristin Fulton MD   Cholecalciferol (Vitamin D) 50 MCG (2000 UT) capsule Take 1 capsule by mouth Daily.   Yes Cristin Fulton MD   CloNIDine (CATAPRES-TTS) 0.1 MG/24HR patch Place 1 patch on the skin as directed by provider 1 (One) Time Per Week.   Yes Cristin Fulton MD   famotidine (PEPCID) 20 MG tablet Take 2 tablets by mouth Daily.   Yes Cristin Fulton MD   hydrALAZINE (APRESOLINE) 25 MG tablet Take 1 tablet by mouth 3 (Three) Times a Day.   Yes Cristin Fulton MD    insulin glargine (LANTUS) 100 UNIT/ML injection Inject 12 Units under the skin into the appropriate area as directed Every Night. IN AM.   Yes Cristin Fulton MD   insulin lispro (humaLOG) 100 UNIT/ML injection Inject 6 Units under the skin into the appropriate area as directed 3 (Three) Times a Day.   Yes Cristin Fulton MD   levothyroxine (SYNTHROID, LEVOTHROID) 112 MCG tablet Take 1 tablet by mouth Daily.   Yes Cristin Fulton MD   NIFEdipine XL (PROCARDIA XL) 60 MG 24 hr tablet Take 1 tablet by mouth Daily.   Yes Cristin Fulton MD   Nutritional Supplements (RENALCAL PO) Take 250 mL by mouth Daily.   Yes Cristin Fulton MD   pantoprazole (PROTONIX) 40 MG EC tablet Take 1 tablet by mouth Daily.   Yes Cristin Fulton MD   sodium-potassium-magnesium sulfates (Suprep Bowel Prep Kit) 17.5-3.13-1.6 GM/177ML solution oral solution Use as directed by provider for colonoscopy prep 2/15/24  Yes Daryl Warren MD   spironolactone (ALDACTONE) 25 MG tablet Take 0.5-1 tablets by mouth Daily. 1/2 tablet M,W,F and whole tablet every other day   Yes Cristin Fulton MD   torsemide (DEMADEX) 20 MG tablet Take 1 tablet by mouth 2 (Two) Times a Day.   Yes Cristin Fulton MD   valsartan (DIOVAN) 160 MG tablet Take 1 tablet by mouth 2 (Two) Times a Day.   Yes Cristin Fulton MD   Methoxy PEG-Epoetin Beta (Mircera) 50 MCG/0.3ML solution prefilled syringe Inject  as directed Every 14 (Fourteen) Days.    Cristin Fulton MD      Current Facility-Administered Medications   Medication Dose Route Frequency Provider Last Rate Last Admin    dextrose 5 % and lactated Ringer's infusion  100 mL/hr Intravenous Continuous Daryl Warren MD        ipratropium-albuterol (DUO-NEB) nebulizer solution 3 mL  3 mL Nebulization Once PRN Junior, Amanda A, CRNA        ondansetron (ZOFRAN) injection 4 mg  4 mg Intravenous Once PRN Junior, Amanda A, CRNA        sodium chloride 0.9  "% flush 10 mL  10 mL Intravenous PRN Daryl Warren MD        sodium chloride 0.9 % flush 3 mL  3 mL Intravenous Q12H Daryl Warren MD        sodium chloride 0.9 % infusion 40 mL  40 mL Intravenous PRN Daryl Warren MD          Past Medical History:   Diagnosis Date    Anemia     Arthritis     Cancer     \"stage 1 uterine cancer prior to hyst\"  per pt    Diabetes mellitus     Disease of thyroid gland     Elevated cholesterol     End stage renal disease     per pt    GERD (gastroesophageal reflux disease)     Hemodialysis patient     left AV fistula, dialysis MWF    History of transfusion     pt has had 3 units total,  or 2021 (1 unit) [place], [place] (no reactions per pt)    Hypertension     Pleural effusion     bilateral per pt    PONV (postoperative nausea and vomiting)     Sleep apnea     uses CPAP    Slow to wake up after anesthesia      Past Surgical History:   Procedure Laterality Date    ARTERIOVENOUS FISTULA      left arm    BREAST SURGERY Right     lumpectomy, x2    CATARACT EXTRACTION W/ INTRAOCULAR LENS  IMPLANT, BILATERAL Bilateral      SECTION      COLONOSCOPY N/A 2022    Procedure: COLONOSCOPY;  Surgeon: Daryl Warren MD;  Location: [facility] END[facility]COPY;  Service: Gastroenterology;  Laterality: N/A;    EYE SURGERY      retinal peel    HYSTERECTOMY      KNEE SURGERY Right     bone spurs removed    THORACENTESIS Bilateral     for bilateral pleural effusions per pt    TONSILLECTOMY       Family History   Problem Relation Age of Onset    Heart attack Father      GI Family History  Colon Cancer  Social History     Tobacco Use   Smoking Status Never   Smokeless Tobacco Never     Social History     Substance and Sexual Activity   Alcohol Use No      Social History     Substance and Sexual Activity   Drug Use No        ------  Review of systems:   Please refer to review of systems and pertinent positives in the history of present illness, all " other groups tested are negative.    Vital Signs   Temp:  [97.5 °F (36.4 °C)] 97.5 °F (36.4 °C)  Heart Rate:  [66] 66  Resp:  [16] 16  BP: (159)/(50) 159/50    Physical Exam:   General Appearance: Alert, in no acute distress  Head: Normocephalic, without obvious abnormality, atraumatic  Eyes: Lids and lashes normal, conjunctivae and sclerae normal, no icterus  Ears: Ears appear intact with no abnormalities noted  Neck: No adenopathy, supple, trachea midline, no thyromegaly, no JVD  Lungs: Clear to auscultation,respirations regular, even and unlabored Heart: Regular rhythm and normal rate  Chest Wall: Symmetrical respiratory expansion  Abdomen: No masses, no organomegaly, soft nontender, nondistended, no guarding, no rebound tenderness  Extremities:  Moves all extremities well, no edema, no cyanosis, no redness  Skin: No bleeding, bruising or rash  Neurologic: Cranial nerves 2 - 12 grossly intact, no focal deficits       LABS:   Lab Results (last 48 hours)       Procedure Component Value Units Date/Time    CBC (No Diff) [[id number]]  (Abnormal) Collected: 02/28/24 1441    Specimen: Blood Updated: 02/28/24 1458     WBC 6.52 10*3/mm3      RBC 2.97 10*6/mm3      Hemoglobin 8.7 g/dL      Hematocrit 26.8 %      MCV 90.2 fL      MCH 29.3 pg      MCHC 32.5 g/dL      RDW 16.0 %      RDW-SD 52.7 fl      MPV 10.6 fL      Platelets 195 10*3/mm3     Potassium [[id number]] Collected: 02/28/24 1441    Specimen: Blood Updated: 02/28/24 1449          RADIOLOGY:  Imaging Results (Last 24 Hours)       ** No results found for the last 24 hours. **            Assessment & Plan       Screen for colon cancer      Impressions and plan #1 heme positive stool and anemia: She has heme positive stool and anemia.  We will plan a colonoscopy to evaluate.  Will remove any polyps that may be present.  Risks, benefits, alternatives were explained in detail.    #2 family history of colon cancer: We will double check the colon.    I discussed the  patient's findings and my recommendations with patient    Daryl Warren MD  02/28/24  15:24 EST

## 2024-02-28 NOTE — OP NOTE
Colonoscopy    Instrument: Pediatric colonoscope      Medications: As per anesthesia      Preop diagnosis: Heme positive stool and anemia      Postop diagnosis: Diverticulosis, mild diverticular associated colitis      Indications: The patient is a 73-year-old female with chronic renal failure on dialysis.  She was found to have some heme positive stool and became anemic.  She is here for colonoscopy.  Upper endoscopy failed to reveal an etiology at .      Procedure: After the patient was informed of the risks, benefits, alternatives to the procedure, informed consent was obtained.  The endoscope was inserted into the rectum and advanced to the terminal ileum.  The terminal ileum was normal for the first few centimeters.  The cecum had some small ectatic blood vessels none of which were bleeding.  There was pandiverticulosis.  There was diverticuli in the right colon transverse colon descending colon and specially the sigmoid colon.  In the sigmoid colon there was some patchy erythematous areas consistent with mild diverticular associated colitis.  There was no evidence of inflammatory bowel disease.  I did not see Crohn's disease or ulcerative colitis.  No polyps were seen.  The patient tolerated the procedure well and there were no immediate complications.  There was no bleeding.  There was an 8-minute withdrawal time.      Impressions and plan #1 diverticulosis      #2 mild diverticular associated colitis.      Plan follow-up colonoscopy as needed.      CC referring Oumar Reaves MD

## 2024-02-28 NOTE — ANESTHESIA POSTPROCEDURE EVALUATION
Patient: Loren Herrera    Procedure Summary       Date: 02/28/24 Room / Location:  [facility] /  [facility]    Anesthesia Start: 1526 Anesthesia Stop: 1613    Procedure: COLONOSCOPY WITH POSSIBLE POLYPECTOMY, BIOPSY, AND CONTROL OF GI BLEEDING Diagnosis:       Screen for colon cancer      (Screen for colon cancer [Z12.11])    Surgeons: Daryl Warren MD Provider: Dionisio Alejandro MD    Anesthesia Type: general, MAC ASA Status: 3            Anesthesia Type: general, MAC    Vitals  Vitals Value Taken Time   /39 02/28/24 1609   Temp     Pulse 60 02/28/24 1614   Resp     SpO2 97 % 02/28/24 1614   Vitals shown include unfiled device data.        Post Anesthesia Care and Evaluation    Patient location during evaluation: PACU  Patient participation: complete - patient participated  Level of consciousness: responsive to verbal stimuli and sleepy but conscious  Pain score: 0  Pain management: adequate    Airway patency: patent  Anesthetic complications: No anesthetic complications  PONV Status: none  Cardiovascular status: hemodynamically stable and acceptable  Respiratory status: nonlabored ventilation, acceptable, nasal cannula and spontaneous ventilation  Hydration status: acceptable

## 2025-07-14 ENCOUNTER — DOCUMENTATION (OUTPATIENT)
Dept: FAMILY MEDICINE CLINIC | Facility: CLINIC | Age: 75
End: 2025-07-14
Payer: MEDICARE

## 2025-07-14 RX ORDER — ASPIRIN 81 MG/1
81 TABLET, CHEWABLE ORAL DAILY
COMMUNITY

## 2025-07-14 RX ORDER — DOCUSATE SODIUM 100 MG/1
100 CAPSULE, LIQUID FILLED ORAL DAILY
COMMUNITY

## 2025-07-14 RX ORDER — CLONIDINE 0.3 MG/24H
1 PATCH, EXTENDED RELEASE TRANSDERMAL WEEKLY
COMMUNITY

## 2025-07-14 RX ORDER — LOSARTAN POTASSIUM 100 MG/1
100 TABLET ORAL 2 TIMES DAILY
COMMUNITY

## 2025-07-14 RX ORDER — ACETAMINOPHEN 500 MG
500 TABLET ORAL EVERY 6 HOURS
COMMUNITY

## 2025-07-14 RX ORDER — CARVEDILOL 6.25 MG/1
6.25 TABLET ORAL 2 TIMES DAILY WITH MEALS
COMMUNITY

## 2025-07-14 RX ORDER — CALCIUM CARBONATE 500 MG/1
2 TABLET, CHEWABLE ORAL DAILY
COMMUNITY

## 2025-07-17 ENCOUNTER — NURSING HOME (OUTPATIENT)
Dept: INTERNAL MEDICINE | Facility: CLINIC | Age: 75
End: 2025-07-17
Payer: MEDICARE

## 2025-07-17 VITALS
SYSTOLIC BLOOD PRESSURE: 164 MMHG | WEIGHT: 132 LBS | HEIGHT: 63 IN | HEART RATE: 68 BPM | RESPIRATION RATE: 18 BRPM | TEMPERATURE: 97 F | OXYGEN SATURATION: 94 % | BODY MASS INDEX: 23.39 KG/M2 | DIASTOLIC BLOOD PRESSURE: 70 MMHG

## 2025-07-17 DIAGNOSIS — L89.159 PRESSURE INJURY OF SKIN OF SACRAL REGION, UNSPECIFIED INJURY STAGE: ICD-10-CM

## 2025-07-17 DIAGNOSIS — E10.9 TYPE 1 DIABETES MELLITUS WITHOUT COMPLICATION: ICD-10-CM

## 2025-07-17 DIAGNOSIS — S82.152A DISPLACED FRACTURE OF LEFT TIBIAL TUBEROSITY, INITIAL ENCOUNTER FOR CLOSED FRACTURE: ICD-10-CM

## 2025-07-17 DIAGNOSIS — R07.9 CHEST PAIN IN ADULT: ICD-10-CM

## 2025-07-17 DIAGNOSIS — Q21.12 PFO (PATENT FORAMEN OVALE): Primary | ICD-10-CM

## 2025-07-17 DIAGNOSIS — N18.6 ESRD (END STAGE RENAL DISEASE): ICD-10-CM

## 2025-07-17 DIAGNOSIS — I10 PRIMARY HYPERTENSION: ICD-10-CM

## 2025-07-17 DIAGNOSIS — K21.9 GASTROESOPHAGEAL REFLUX DISEASE WITHOUT ESOPHAGITIS: ICD-10-CM

## 2025-07-17 DIAGNOSIS — E03.9 ACQUIRED HYPOTHYROIDISM: ICD-10-CM

## 2025-07-17 DIAGNOSIS — K59.01 SLOW TRANSIT CONSTIPATION: ICD-10-CM

## 2025-07-17 PROCEDURE — 99306 1ST NF CARE HIGH MDM 50: CPT | Performed by: INTERNAL MEDICINE

## 2025-07-17 NOTE — LETTER
Nursing Home History and Physical       Hubert Cuellar   [address] [place] [address] Phone: [phone]  Fax: [phone]     PATIENT NAME: Loren Herrera                                                                          YOB: 1950           DATE OF SERVICE: 07/17/2025  FACILITY:  [facility]    CHIEF COMPLAINT:  Nursing facility admission    History of Present Illness  The patient is a 74-year-old female who was admitted to this nursing facility after hospitalization at [facility] from 07/07/2025 to 07/12/2025. She has a history of end-stage renal disease and is on hemodialysis on Monday, Wednesday, and Fridays, pulmonary hypertension, type 1 diabetes, and hypothyroidism. She was recently hospitalized after suffering a fall and was found to have a left tibial tuberosity fracture. She underwent surgical intervention by orthopedics and was advised to continue aspirin 81 mg twice daily until 08/04/2025 for DVT prophylaxis. Due to debility and weakness following her fracture, she was then transferred to this facility for further strengthening and rehabilitation.    She reports experiencing constipation during her hospital stay, which she managed with Dulcolax taken twice daily.    She is currently awaiting wound care for a sore on her buttocks that developed during an 18-day isolation period at [facility] for blood pressure management and COVID-19 treatment. The sore has become increasingly painful since her return from the hospital. An x-ray revealed no internal issues, and she was advised to use a pad. She received one treatment for the sore during the night shift. A wound nurse suggested the rough area could be a callus and recommended applying Vaseline, gauze, and dressing to the spot.    PAST SURGICAL HISTORY:  Left tibial tuberosity fracture surgical intervention       PAST MEDICAL & SURGICAL HISTORY:   Past Medical History:   Diagnosis Date   • Anemia   "  • Arthritis    • Atherosclerotic heart disease of native coronary artery without angina pectoris    • Cancer     \"stage 1 uterine cancer prior to hyst\"  per pt   • Diabetes mellitus    • Disease of thyroid gland    • Displaced fracture of left tibial tuberosity, subsequent encounter for closed fracture with routine healing    • Elevated cholesterol    • End stage renal disease     per pt   • End stage renal disease    • GERD (gastroesophageal reflux disease)    • Heart failure, unspecified    • Hemodialysis patient     left AV fistula, dialysis MWF   • History of transfusion     pt has had 3 units total,  or 2021 (1 unit) [place], [place] (no reactions per pt)   • Hypertension    • Hypothyroidism    • Pleural effusion     bilateral per pt   • PONV (postoperative nausea and vomiting)    • Sleep apnea     uses CPAP   • Slow to wake up after anesthesia    • Type 1 diabetes mellitus with unspecified diabetic retinopathy without macular edema    • Unspecified osteoarthritis, unspecified site       Past Surgical History:   Procedure Laterality Date   • ANGIOPLASTY     • ARTERIOVENOUS FISTULA      left arm   • BREAST SURGERY Right     lumpectomy, x2   • CARDIAC CATHETERIZATION Left 2022   • CATARACT EXTRACTION W/ INTRAOCULAR LENS  IMPLANT, BILATERAL Bilateral    •  SECTION     • COLONOSCOPY N/A 2022    Procedure: COLONOSCOPY;  Surgeon: Daryl Warren MD;  Location:  [facility];  Service: Gastroenterology;  Laterality: N/A;   • COLONOSCOPY N/A 2024    Procedure: COLONOSCOPY;  Surgeon: Daryl Warren MD;  Location:  [facility];  Service: Gastroenterology;  Laterality: N/A;   • EYE SURGERY      retinal peel   • HYSTERECTOMY     • KNEE SURGERY Right     bone spurs removed   • SKIN CANCER EXCISION      BASAL CELL CARCINOMA   • THORACENTESIS Bilateral     for bilateral pleural effusions per pt   • TONSILLECTOMY     • VITRECTOMY           MEDICATIONS:  I " "have reviewed and reconciled the patients medication list in the patients chart at the skilled nursing [facility] on 07/17/2025.      ALLERGIES:  Allergies   Allergen Reactions   • Norvasc [Amlodipine] Swelling   • Insulin Detemir Swelling     Pt states it is Levemir  Local reaction    • Penicillins Hives         SOCIAL HISTORY:  Social History     Socioeconomic History   • Marital status:    Tobacco Use   • Smoking status: Never   • Smokeless tobacco: Never   Vaping Use   • Vaping status: Never Used   Substance and Sexual Activity   • Alcohol use: No   • Drug use: No   • Sexual activity: Defer       FAMILY HISTORY:  Family History   Problem Relation Age of Onset   • Hypertension Mother    • Heart disease Mother    • Aneurysm Mother    • Hypertension Father    • Diabetes Father    • Heart attack Father    • Colon cancer Father    • Heart disease Maternal Grandmother    • Hypertension Maternal Grandfather         REVIEW OF SYSTEMS:  Review of Systems   Constitutional:  Negative for chills, fatigue and fever.   HENT:  Negative for congestion, ear pain, rhinorrhea, sinus pressure and sore throat.    Eyes:  Negative for visual disturbance.   Respiratory:  Negative for cough, chest tightness, shortness of breath and wheezing.    Cardiovascular:  Negative for chest pain, palpitations and leg swelling.   Gastrointestinal:  Negative for abdominal pain, blood in stool, constipation, diarrhea, nausea and vomiting.   Endocrine: Negative for polydipsia and polyuria.   Genitourinary:  Negative for dysuria and hematuria.   Musculoskeletal:  Negative for arthralgias and back pain.   Skin:  Negative for rash.   Neurological:  Negative for dizziness, light-headedness, numbness and headaches.   Psychiatric/Behavioral:  Negative for dysphoric mood and sleep disturbance. The patient is not nervous/anxious.          PHYSICAL EXAMINATION:   VITAL SIGNS: /70   Pulse 68   Temp 97 °F (36.1 °C)   Resp 18   Ht 160 cm (63\")   " Wt 59.9 kg (132 lb)   SpO2 94%   BMI 23.38 kg/m²     Physical Exam  Vitals and nursing note reviewed.   Constitutional:       Appearance: Normal appearance. She is well-developed.      Comments: Frail, bed bound   HENT:      Head: Normocephalic and atraumatic.      Nose: Nose normal.      Mouth/Throat:      Mouth: Mucous membranes are moist.      Pharynx: No oropharyngeal exudate.   Eyes:      General: No scleral icterus.     Conjunctiva/sclera: Conjunctivae normal.      Pupils: Pupils are equal, round, and reactive to light.   Neck:      Thyroid: No thyromegaly.   Cardiovascular:      Rate and Rhythm: Normal rate and regular rhythm.      Heart sounds: Normal heart sounds. No murmur heard.     No friction rub. No gallop.   Pulmonary:      Effort: Pulmonary effort is normal. No respiratory distress.      Breath sounds: Normal breath sounds. No wheezing.   Abdominal:      General: Bowel sounds are normal. There is no distension.      Palpations: Abdomen is soft.      Tenderness: There is no abdominal tenderness.   Musculoskeletal:         General: No deformity or signs of injury.      Cervical back: Normal range of motion and neck supple.   Lymphadenopathy:      Cervical: No cervical adenopathy.   Skin:     General: Skin is warm and dry.      Findings: No rash.   Neurological:      Mental Status: She is alert and oriented to person, place, and time.   Psychiatric:         Mood and Affect: Mood normal.         Behavior: Behavior normal.         RECORDS REVIEW:   Discharge Summary [facility] 7/12/2025  Results  Imaging   - X-ray of the bottom: No abnormalities on the inside    ASSESSMENT   Diagnoses and all orders for this visit:    1. PFO (patent foramen ovale) (Primary)    2. Chest pain in adult    3. Displaced fracture of left tibial tuberosity, initial encounter for closed fracture    4. Type 1 diabetes mellitus without complication    5. Acquired hypothyroidism    6. Gastroesophageal reflux disease without  esophagitis    7. Primary hypertension    8. ESRD (end stage renal disease)    9. Slow transit constipation    10. Pressure injury of skin of sacral region, unspecified injury stage        Assessment & Plan  Left tibial tuberosity fracture.  - Status post surgical intervention, currently stable.  - Weightbearing as tolerated to the left lower extremity.  - Follow-up with orthopedics as scheduled.  - Continue aspirin 81 mg b.i.d. until 08/04/2025 for DVT prophylaxis.    Type 1 diabetes mellitus.  - Uses Dexcom and managing insulin well.  - Insulin glargine 12 units daily and mealtime insulin up to 20 units daily.  - On a sliding scale.  - Continue current insulin regimen.    Hypothyroidism.  - Continue levothyroxine 112 mcg daily.    Gastroesophageal reflux disease.  - Continue pantoprazole.    Hypertension.  - Continue valsartan. Nifedipine can be given every evening (per patient request).    Chronic kidney disease.  - Condition stable on current medication regimen per nephrology.    Hyperlipidemia.  - Continue atorvastatin.    End-stage renal disease.  - Continue dialysis on Monday, Wednesday, and Friday.    Constipation.  - Reports taking Dulcolax in the morning and evening to manage constipation experienced during hospitalization.  - medication adjustments made to give Dulcolax BID per patient preference.     Pressure ulcer.  - Developed a sore on her bottom during a previous hospital stay.  - Wound being managed with dressing and Vaseline as needed.  - Ensure wound care orders are in place and followed.  Wound care is following in the nursing facility.     54 min spent with direct patient care, review of medical chart, discussion with nursing staff, and medical decision making.       [x]  Discussed Patient in detail with nursing/staff, addressed all needs today.     [x]  Plan of Care Reviewed   [x]  PT/OT Reviewed   [x]  Order Changes  []  Discharge Plans Reviewed  [x]  Advance Directive on file with Nursing  Home.   [x]  POA on file with Nursing Home.    [x]  Code Status listed and reviewed.     Hubert Cuellar DO.  7/21/2025      **Part of this note may be an electronic transcription/translation of spoken language to printed text using the Dragon Dictation System.**    Patient or patient representative verbalized consent for the use of Ambient Listening during the visit with  Hubert Cuellar DO for chart documentation. 7/21/2025  15:13 EDT

## 2025-07-17 NOTE — PROGRESS NOTES
Nursing Home History and Physical       Hubert Cuellar   [address] [place] [address] Phone: [phone]  Fax: [phone]     PATIENT NAME: Loren Herrera                                                                          YOB: 1950           DATE OF SERVICE: 07/17/2025  FACILITY:  [facility]    CHIEF COMPLAINT:  Nursing facility admission    History of Present Illness  The patient is a 74-year-old female who was admitted to this nursing facility after hospitalization at [facility] from 07/07/2025 to 07/12/2025. She has a history of end-stage renal disease and is on hemodialysis on Monday, Wednesday, and Fridays, pulmonary hypertension, type 1 diabetes, and hypothyroidism. She was recently hospitalized after suffering a fall and was found to have a left tibial tuberosity fracture. She underwent surgical intervention by orthopedics and was advised to continue aspirin 81 mg twice daily until 08/04/2025 for DVT prophylaxis. Due to debility and weakness following her fracture, she was then transferred to this facility for further strengthening and rehabilitation.    She reports experiencing constipation during her hospital stay, which she managed with Dulcolax taken twice daily.    She is currently awaiting wound care for a sore on her buttocks that developed during an 18-day isolation period at [facility] for blood pressure management and COVID-19 treatment. The sore has become increasingly painful since her return from the hospital. An x-ray revealed no internal issues, and she was advised to use a pad. She received one treatment for the sore during the night shift. A wound nurse suggested the rough area could be a callus and recommended applying Vaseline, gauze, and dressing to the spot.    PAST SURGICAL HISTORY:  Left tibial tuberosity fracture surgical intervention       PAST MEDICAL & SURGICAL HISTORY:   Past Medical History:   Diagnosis Date    Anemia   "   Arthritis     Atherosclerotic heart disease of native coronary artery without angina pectoris     Cancer     \"stage 1 uterine cancer prior to hyst\"  per pt    Diabetes mellitus     Disease of thyroid gland     Displaced fracture of left tibial tuberosity, subsequent encounter for closed fracture with routine healing     Elevated cholesterol     End stage renal disease     per pt    End stage renal disease     GERD (gastroesophageal reflux disease)     Heart failure, unspecified     Hemodialysis patient     left AV fistula, dialysis MWF    History of transfusion     pt has had 3 units total,  or 2021 (1 unit) [facility] [place], [place] (no reactions per pt)    Hypertension     Hypothyroidism     Pleural effusion     bilateral per pt    PONV (postoperative nausea and vomiting)     Sleep apnea     uses CPAP    Slow to wake up after anesthesia     Type 1 diabetes mellitus with unspecified diabetic retinopathy without macular edema     Unspecified osteoarthritis, unspecified site       Past Surgical History:   Procedure Laterality Date    ANGIOPLASTY      ARTERIOVENOUS FISTULA      left arm    BREAST SURGERY Right     lumpectomy, x2    CARDIAC CATHETERIZATION Left 2022    CATARACT EXTRACTION W/ INTRAOCULAR LENS  IMPLANT, BILATERAL Bilateral      SECTION      COLONOSCOPY N/A 2022    Procedure: COLONOSCOPY;  Surgeon: Daryl Warren MD;  Location:  [facility];  Service: Gastroenterology;  Laterality: N/A;    COLONOSCOPY N/A 2024    Procedure: COLONOSCOPY;  Surgeon: Daryl Warren MD;  Location:  [facility];  Service: Gastroenterology;  Laterality: N/A;    EYE SURGERY      retinal peel    HYSTERECTOMY      KNEE SURGERY Right     bone spurs removed    SKIN CANCER EXCISION      BASAL CELL CARCINOMA    THORACENTESIS Bilateral     for bilateral pleural effusions per pt    TONSILLECTOMY      VITRECTOMY           MEDICATIONS:  I have reviewed and reconciled the " "patients medication list in the patients chart at the [facility] on 07/17/2025.      ALLERGIES:  Allergies   Allergen Reactions    Norvasc [Amlodipine] Swelling    Insulin Detemir Swelling     Pt states it is Levemir  Local reaction     Penicillins Hives         SOCIAL HISTORY:  Social History     Socioeconomic History    Marital status:    Tobacco Use    Smoking status: Never    Smokeless tobacco: Never   Vaping Use    Vaping status: Never Used   Substance and Sexual Activity    Alcohol use: No    Drug use: No    Sexual activity: Defer       FAMILY HISTORY:  Family History   Problem Relation Age of Onset    Hypertension Mother     Heart disease Mother     Aneurysm Mother     Hypertension Father     Diabetes Father     Heart attack Father     Colon cancer Father     Heart disease Maternal Grandmother     Hypertension Maternal Grandfather         REVIEW OF SYSTEMS:  Review of Systems   Constitutional:  Negative for chills, fatigue and fever.   HENT:  Negative for congestion, ear pain, rhinorrhea, sinus pressure and sore throat.    Eyes:  Negative for visual disturbance.   Respiratory:  Negative for cough, chest tightness, shortness of breath and wheezing.    Cardiovascular:  Negative for chest pain, palpitations and leg swelling.   Gastrointestinal:  Negative for abdominal pain, blood in stool, constipation, diarrhea, nausea and vomiting.   Endocrine: Negative for polydipsia and polyuria.   Genitourinary:  Negative for dysuria and hematuria.   Musculoskeletal:  Negative for arthralgias and back pain.   Skin:  Negative for rash.   Neurological:  Negative for dizziness, light-headedness, numbness and headaches.   Psychiatric/Behavioral:  Negative for dysphoric mood and sleep disturbance. The patient is not nervous/anxious.          PHYSICAL EXAMINATION:   VITAL SIGNS: /70   Pulse 68   Temp 97 °F (36.1 °C)   Resp 18   Ht 160 cm (63\")   Wt 59.9 kg (132 lb)   SpO2 94%   BMI 23.38 kg/m² "     Physical Exam  Vitals and nursing note reviewed.   Constitutional:       Appearance: Normal appearance. She is well-developed.      Comments: Frail, bed bound   HENT:      Head: Normocephalic and atraumatic.      Nose: Nose normal.      Mouth/Throat:      Mouth: Mucous membranes are moist.      Pharynx: No oropharyngeal exudate.   Eyes:      General: No scleral icterus.     Conjunctiva/sclera: Conjunctivae normal.      Pupils: Pupils are equal, round, and reactive to light.   Neck:      Thyroid: No thyromegaly.   Cardiovascular:      Rate and Rhythm: Normal rate and regular rhythm.      Heart sounds: Normal heart sounds. No murmur heard.     No friction rub. No gallop.   Pulmonary:      Effort: Pulmonary effort is normal. No respiratory distress.      Breath sounds: Normal breath sounds. No wheezing.   Abdominal:      General: Bowel sounds are normal. There is no distension.      Palpations: Abdomen is soft.      Tenderness: There is no abdominal tenderness.   Musculoskeletal:         General: No deformity or signs of injury.      Cervical back: Normal range of motion and neck supple.   Lymphadenopathy:      Cervical: No cervical adenopathy.   Skin:     General: Skin is warm and dry.      Findings: No rash.   Neurological:      Mental Status: She is alert and oriented to person, place, and time.   Psychiatric:         Mood and Affect: Mood normal.         Behavior: Behavior normal.         RECORDS REVIEW:   Discharge Summary [facility] 7/12/2025  Results  Imaging   - X-ray of the bottom: No abnormalities on the inside    ASSESSMENT   Diagnoses and all orders for this visit:    1. PFO (patent foramen ovale) (Primary)    2. Chest pain in adult    3. Displaced fracture of left tibial tuberosity, initial encounter for closed fracture    4. Type 1 diabetes mellitus without complication    5. Acquired hypothyroidism    6. Gastroesophageal reflux disease without esophagitis    7. Primary hypertension    8. ESRD (end  stage renal disease)    9. Slow transit constipation    10. Pressure injury of skin of sacral region, unspecified injury stage        Assessment & Plan  Left tibial tuberosity fracture.  - Status post surgical intervention, currently stable.  - Weightbearing as tolerated to the left lower extremity.  - Follow-up with orthopedics as scheduled.  - Continue aspirin 81 mg b.i.d. until 08/04/2025 for DVT prophylaxis.    Type 1 diabetes mellitus.  - Uses Dexcom and managing insulin well.  - Insulin glargine 12 units daily and mealtime insulin up to 20 units daily.  - On a sliding scale.  - Continue current insulin regimen.    Hypothyroidism.  - Continue levothyroxine 112 mcg daily.    Gastroesophageal reflux disease.  - Continue pantoprazole.    Hypertension.  - Continue valsartan. Nifedipine can be given every evening (per patient request).    Chronic kidney disease.  - Condition stable on current medication regimen per nephrology.    Hyperlipidemia.  - Continue atorvastatin.    End-stage renal disease.  - Continue dialysis on Monday, Wednesday, and Friday.    Constipation.  - Reports taking Dulcolax in the morning and evening to manage constipation experienced during hospitalization.  - medication adjustments made to give Dulcolax BID per patient preference.     Pressure ulcer.  - Developed a sore on her bottom during a previous hospital stay.  - Wound being managed with dressing and Vaseline as needed.  - Ensure wound care orders are in place and followed.  Wound care is following in the nursing facility.     54 min spent with direct patient care, review of medical chart, discussion with nursing staff, and medical decision making.       [x]  Discussed Patient in detail with nursing/staff, addressed all needs today.     [x]  Plan of Care Reviewed   [x]  PT/OT Reviewed   [x]  Order Changes  []  Discharge Plans Reviewed  [x]  Advance Directive on file with Nursing Home.   [x]  POA on file with Nursing Home.    [x]  Code  Status listed and reviewed.     Hubert Cuellar DO.  7/21/2025      **Part of this note may be an electronic transcription/translation of spoken language to printed text using the Dragon Dictation System.**    Patient or patient representative verbalized consent for the use of Ambient Listening during the visit with  Hubert Cuellar DO for chart documentation. 7/21/2025  15:13 EDT    normal...

## (undated) DEVICE — CONTN GRAD MEAS TRIANG 32OZ BLK

## (undated) DEVICE — SOLIDIFIER LIQ PREMISORB 1500CC

## (undated) DEVICE — INTRO ACCSR BLNT TP

## (undated) DEVICE — LUBE JELLY ENDO 1.4OZ

## (undated) DEVICE — ADAPT CLN LUM OLYMP AIR/H20

## (undated) DEVICE — SYR LUERLOK 50ML

## (undated) DEVICE — ENDOGATOR HYBRID TUBING KIT FOR USE WITH ENDOGATOR IRRIGATION PUMP, OLYMPUS PUMP, GI4000 ESU, AND TORRENT IRRIGATION PUMP.: Brand: ENDOGATOR KIT

## (undated) DEVICE — SOL IRR H2O BTL 1000ML STRL

## (undated) DEVICE — TBG CO2 CLEVERCAP FOR OLYMPUS SCOPES

## (undated) DEVICE — "MH-443 SUCTION VALVE F/EVIS140 EVIS160": Brand: SUCTION VALVE

## (undated) DEVICE — CANSTR SXN CRYSTALINE SEMI RIGID W/1ELBW 1000CC SURG

## (undated) DEVICE — KT ORCA ORCAPOD DISP STRL

## (undated) DEVICE — Device: Brand: AIR/WATER CHANNEL CLEANING ADAPTER

## (undated) DEVICE — ST LINER SAFECAP GRN RED CP STRL

## (undated) DEVICE — "MH-438 A/W VLVE F/140 EVIS-140": Brand: AIR/WATER VALVE

## (undated) DEVICE — TBG SXN CONN/F UNIV 1/4IN 10FT LF STRL

## (undated) DEVICE — KT BEDSD ENZYM PRECLN 500ML